# Patient Record
Sex: MALE | Race: WHITE | Employment: STUDENT | URBAN - METROPOLITAN AREA
[De-identification: names, ages, dates, MRNs, and addresses within clinical notes are randomized per-mention and may not be internally consistent; named-entity substitution may affect disease eponyms.]

---
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
## 2024-10-21 ENCOUNTER — HOSPITAL ENCOUNTER (EMERGENCY)
Age: 19
Discharge: HOME | End: 2024-10-21
Attending: EMERGENCY MEDICINE

## 2024-10-21 VITALS
SYSTOLIC BLOOD PRESSURE: 101 MMHG | HEART RATE: 80 BPM | TEMPERATURE: 98.3 F | RESPIRATION RATE: 17 BRPM | DIASTOLIC BLOOD PRESSURE: 64 MMHG | OXYGEN SATURATION: 98 % | HEIGHT: 67 IN | BODY MASS INDEX: 18.43 KG/M2 | WEIGHT: 117.4 LBS

## 2024-10-21 DIAGNOSIS — H10.211 CHEMICAL CONJUNCTIVITIS OF RIGHT EYE: Primary | ICD-10-CM

## 2024-10-21 PROCEDURE — 99283 EMERGENCY DEPT VISIT LOW MDM: CPT

## 2024-10-21 RX ORDER — TETRACAINE HYDROCHLORIDE 5 MG/ML
1 SOLUTION OPHTHALMIC ONCE
Status: DISCONTINUED | OUTPATIENT
Start: 2024-10-21 | End: 2024-10-21 | Stop reason: HOSPADM

## 2024-10-21 RX ORDER — NEOMYCIN SULFATE, POLYMYXIN B SULFATE AND DEXAMETHASONE 3.5; 10000; 1 MG/ML; [USP'U]/ML; MG/ML
1 SUSPENSION/ DROPS OPHTHALMIC 2 TIMES DAILY
Qty: 1 EACH | Refills: 0 | Status: SHIPPED | OUTPATIENT
Start: 2024-10-21 | End: 2024-10-31

## 2024-10-21 ASSESSMENT — PAIN DESCRIPTION - DESCRIPTORS: DESCRIPTORS: ACHING

## 2024-10-21 ASSESSMENT — LIFESTYLE VARIABLES
HOW OFTEN DO YOU HAVE A DRINK CONTAINING ALCOHOL: NEVER
HOW MANY STANDARD DRINKS CONTAINING ALCOHOL DO YOU HAVE ON A TYPICAL DAY: PATIENT DOES NOT DRINK

## 2024-10-21 ASSESSMENT — PAIN DESCRIPTION - ORIENTATION: ORIENTATION: RIGHT

## 2024-10-21 ASSESSMENT — PAIN SCALES - GENERAL: PAINLEVEL_OUTOF10: 2

## 2024-10-21 ASSESSMENT — PAIN DESCRIPTION - LOCATION: LOCATION: EYE

## 2024-10-21 ASSESSMENT — PAIN - FUNCTIONAL ASSESSMENT: PAIN_FUNCTIONAL_ASSESSMENT: 0-10

## 2024-10-22 NOTE — ED PROVIDER NOTES
EMERGENCY DEPARTMENT ENCOUNTER     [facility]  ED     Pt Name: Ted Villanueva   MRN: [id number]   Birthdate 2005   Date of evaluation: 10/21/2024   Provider: Gene Garces MD   PCP: ., *[facility] (Inactive)   Note Started: 5:25 AM EDT 10/22/24     CHIEF COMPLAINT     Chief Complaint   Patient presents with    Eye Injury     Large drop of bleach in right eye this morning at 1030. States he used some eye drops and flushed the eye this morning, but still having pain. States he has a stye on that eye too.         HISTORY OF PRESENT ILLNESS:  History from : Patient   Limitations to history : None     Ted Villanueva is a 19 y.o. male who presents for eye pain.  Patient reports that about 10 hours prior to arrival he got bleach in the right eye and flushed out with a garden hose.  He had increasing irritation throughout the day prompting presentation.  He denies any trouble with his vision.    Nursing Notes were all reviewed and agreed with or any disagreements were addressed in the HPI.     ROS: Positives and Pertinent negatives as per HPI.    PAST MEDICAL HISTORY     Past medical history:  has no past medical history on file.    Past surgical history:  has no past surgical history on file.    Med list:   No current facility-administered medications on file prior to encounter.     No current outpatient medications on file prior to encounter.       PHYSICAL EXAM:  ED Triage Vitals [10/21/24 1953]   BP Systolic BP Percentile Diastolic BP Percentile Temp Temp Source Pulse Respirations SpO2   122/78 -- -- 98.3 °F (36.8 °C) Oral 80 15 100 %      Height Weight - Scale         1.702 m (5' 7\") 53.3 kg (117 lb 6.4 oz)              Physical Exam   pH in the right eye is 7.  Fluorescein exam reveals no uptake.  Corneal injection noted.        EMERGENCY DEPARTMENT COURSE and DIFFERENTIAL DIAGNOSIS/MDM:          Vitals:    Vitals:    10/21/24 1953 10/21/24 2102   BP: 122/78 101/64   Pulse:

## 2025-05-31 ENCOUNTER — APPOINTMENT (OUTPATIENT)
Dept: GENERAL RADIOLOGY | Age: 20
End: 2025-05-31
Payer: COMMERCIAL

## 2025-05-31 ENCOUNTER — APPOINTMENT (OUTPATIENT)
Dept: CT IMAGING | Age: 20
End: 2025-05-31
Payer: COMMERCIAL

## 2025-05-31 ENCOUNTER — HOSPITAL ENCOUNTER (EMERGENCY)
Age: 20
Discharge: HOME | End: 2025-05-31
Payer: COMMERCIAL

## 2025-05-31 VITALS
SYSTOLIC BLOOD PRESSURE: 109 MMHG | TEMPERATURE: 98.9 F | HEART RATE: 93 BPM | OXYGEN SATURATION: 100 % | DIASTOLIC BLOOD PRESSURE: 63 MMHG | RESPIRATION RATE: 18 BRPM

## 2025-05-31 DIAGNOSIS — R55 SYNCOPE AND COLLAPSE: Primary | ICD-10-CM

## 2025-05-31 DIAGNOSIS — J18.9 PNEUMONIA DUE TO INFECTIOUS ORGANISM, UNSPECIFIED LATERALITY, UNSPECIFIED PART OF LUNG: ICD-10-CM

## 2025-05-31 LAB
ALBUMIN SERPL-MCNC: 5.3 G/DL (ref 3.4–5)
ALBUMIN/GLOB SERPL: 2 {RATIO} (ref 1.1–2.2)
ALP SERPL-CCNC: 72 U/L (ref 40–129)
ALT SERPL-CCNC: 28 U/L (ref 10–40)
ANION GAP SERPL CALCULATED.3IONS-SCNC: 14 MMOL/L (ref 3–16)
AST SERPL-CCNC: 33 U/L (ref 15–37)
BASOPHILS # BLD: 0.1 K/UL (ref 0–0.2)
BASOPHILS NFR BLD: 1.3 %
BILIRUB SERPL-MCNC: 0.4 MG/DL (ref 0–1)
BUN SERPL-MCNC: 20 MG/DL (ref 7–20)
CALCIUM SERPL-MCNC: 9.9 MG/DL (ref 8.3–10.6)
CHLORIDE SERPL-SCNC: 104 MMOL/L (ref 99–110)
CO2 SERPL-SCNC: 20 MMOL/L (ref 21–32)
CREAT SERPL-MCNC: 1 MG/DL (ref 0.9–1.3)
D-DIMER QUANTITATIVE: 2.6 UG/ML FEU (ref 0–0.6)
DEPRECATED RDW RBC AUTO: 13.3 % (ref 12.4–15.4)
EOSINOPHIL # BLD: 0.3 K/UL (ref 0–0.6)
EOSINOPHIL NFR BLD: 4 %
GFR SERPLBLD CREATININE-BSD FMLA CKD-EPI: >90 ML/MIN/{1.73_M2}
GLUCOSE SERPL-MCNC: 80 MG/DL (ref 70–99)
HCT VFR BLD AUTO: 41.7 % (ref 40.5–52.5)
HGB BLD-MCNC: 14.2 G/DL (ref 13.5–17.5)
LYMPHOCYTES # BLD: 3.1 K/UL (ref 1–5.1)
LYMPHOCYTES NFR BLD: 43.3 %
MCH RBC QN AUTO: 30.1 PG (ref 26–34)
MCHC RBC AUTO-ENTMCNC: 34.1 G/DL (ref 31–36)
MCV RBC AUTO: 88.3 FL (ref 80–100)
MONOCYTES # BLD: 0.4 K/UL (ref 0–1.3)
MONOCYTES NFR BLD: 5.7 %
NEUTROPHILS # BLD: 3.2 K/UL (ref 1.7–7.7)
NEUTROPHILS NFR BLD: 45.7 %
PLATELET # BLD AUTO: 206 K/UL (ref 135–450)
PMV BLD AUTO: 8.7 FL (ref 5–10.5)
POTASSIUM SERPL-SCNC: 4.2 MMOL/L (ref 3.5–5.1)
PROT SERPL-MCNC: 8 G/DL (ref 6.4–8.2)
RBC # BLD AUTO: 4.72 M/UL (ref 4.2–5.9)
SODIUM SERPL-SCNC: 138 MMOL/L (ref 136–145)
TROPONIN, HIGH SENSITIVITY: <6 NG/L (ref 0–22)
TROPONIN, HIGH SENSITIVITY: <6 NG/L (ref 0–22)
WBC # BLD AUTO: 7.1 K/UL (ref 4–11)

## 2025-05-31 PROCEDURE — 84484 ASSAY OF TROPONIN QUANT: CPT

## 2025-05-31 PROCEDURE — 99285 EMERGENCY DEPT VISIT HI MDM: CPT

## 2025-05-31 PROCEDURE — 36415 COLL VENOUS BLD VENIPUNCTURE: CPT

## 2025-05-31 PROCEDURE — 85379 FIBRIN DEGRADATION QUANT: CPT

## 2025-05-31 PROCEDURE — 6360000004 HC RX CONTRAST MEDICATION: Performed by: PHYSICIAN ASSISTANT

## 2025-05-31 PROCEDURE — 71046 X-RAY EXAM CHEST 2 VIEWS: CPT

## 2025-05-31 PROCEDURE — 71260 CT THORAX DX C+: CPT

## 2025-05-31 PROCEDURE — 96360 HYDRATION IV INFUSION INIT: CPT

## 2025-05-31 PROCEDURE — 96361 HYDRATE IV INFUSION ADD-ON: CPT

## 2025-05-31 PROCEDURE — 80053 COMPREHEN METABOLIC PANEL: CPT

## 2025-05-31 PROCEDURE — 93005 ELECTROCARDIOGRAM TRACING: CPT | Performed by: STUDENT IN AN ORGANIZED HEALTH CARE EDUCATION/TRAINING PROGRAM

## 2025-05-31 PROCEDURE — 70450 CT HEAD/BRAIN W/O DYE: CPT

## 2025-05-31 PROCEDURE — 2580000003 HC RX 258: Performed by: PHYSICIAN ASSISTANT

## 2025-05-31 PROCEDURE — 6370000000 HC RX 637 (ALT 250 FOR IP): Performed by: PHYSICIAN ASSISTANT

## 2025-05-31 PROCEDURE — 85025 COMPLETE CBC W/AUTO DIFF WBC: CPT

## 2025-05-31 RX ORDER — 0.9 % SODIUM CHLORIDE 0.9 %
1000 INTRAVENOUS SOLUTION INTRAVENOUS ONCE
Status: COMPLETED | OUTPATIENT
Start: 2025-05-31 | End: 2025-05-31

## 2025-05-31 RX ORDER — IOPAMIDOL 755 MG/ML
75 INJECTION, SOLUTION INTRAVASCULAR
Status: COMPLETED | OUTPATIENT
Start: 2025-05-31 | End: 2025-05-31

## 2025-05-31 RX ORDER — AZITHROMYCIN 250 MG/1
500 TABLET, FILM COATED ORAL ONCE
Status: COMPLETED | OUTPATIENT
Start: 2025-05-31 | End: 2025-05-31

## 2025-05-31 RX ORDER — ONDANSETRON 4 MG/1
4 TABLET, FILM COATED ORAL 3 TIMES DAILY PRN
Qty: 15 TABLET | Refills: 0 | Status: SHIPPED | OUTPATIENT
Start: 2025-05-31

## 2025-05-31 RX ORDER — AZITHROMYCIN 250 MG/1
TABLET, FILM COATED ORAL
Qty: 6 TABLET | Refills: 0 | Status: SHIPPED | OUTPATIENT
Start: 2025-05-31 | End: 2025-06-10

## 2025-05-31 RX ADMIN — IOPAMIDOL 75 ML: 755 INJECTION, SOLUTION INTRAVENOUS at 22:21

## 2025-05-31 RX ADMIN — AZITHROMYCIN 500 MG: 250 TABLET, FILM COATED ORAL at 23:48

## 2025-05-31 RX ADMIN — SODIUM CHLORIDE 1000 ML: 9 INJECTION, SOLUTION INTRAVENOUS at 20:18

## 2025-05-31 RX ADMIN — AMOXICILLIN AND CLAVULANATE POTASSIUM 1 TABLET: 875; 125 TABLET, FILM COATED ORAL at 23:48

## 2025-06-01 LAB
EKG ATRIAL RATE: 54 BPM
EKG DIAGNOSIS: NORMAL
EKG P AXIS: -5 DEGREES
EKG P-R INTERVAL: 126 MS
EKG Q-T INTERVAL: 402 MS
EKG QRS DURATION: 100 MS
EKG QTC CALCULATION (BAZETT): 381 MS
EKG R AXIS: 90 DEGREES
EKG T AXIS: 69 DEGREES
EKG VENTRICULAR RATE: 54 BPM

## 2025-06-01 PROCEDURE — 93010 ELECTROCARDIOGRAM REPORT: CPT | Performed by: INTERNAL MEDICINE

## 2025-06-01 NOTE — DISCHARGE INSTRUCTIONS
You can use OTC Tylenol every 4-6 hours as needed for headache.  Take medication as prescribed.  Follow-up with your primary care provider.

## 2025-06-02 NOTE — ED PROVIDER NOTES
[facility]    CHIEF COMPLAINT  Loss of Consciousness ([place] via [place] with c/o syncope. Pt felt lightheaded prior to syncopal episode, fell back and hit the back of his head on concrete.A/Ox4 )      SHARED SERVICE VISIT  Evaluated by LOAN.  My supervising physician was available for consultation.    HISTORY OF PRESENT ILLNESS  Ted Villanueva is a 20 y.o. male who presents to the ED complaining of syncopal episode.  He was at a festival earlier today when he began experiencing some nausea and lightheadedness.  He said he went to go sit down and next thing he knows he woke up staring at the anna.  Witnesses stated that he passed out and struck his head on the concrete.  After waking up he denies having any chest pain.  He is still feeling weak and nauseous and denies any previous episodes like this in the past. Denies any headache, body ache, fevers or chills.  Denies any coughing or sneezing.  Denies any sore throat or congestion.  Denies any vision changes or dizziness.  Denies any chest pain, shortness of breath, or dyspnea on exertion.  Denies any  abdominal pain.  Denies any urinary symptoms.  Denies any diarrhea or bloody stools.  Denies any new onset back pain.  Denies any recent travel or sick contacts.    No other complaints, modifying factors or associated symptoms.     Nursing notes reviewed.   No past medical history on file.  No past surgical history on file.  No family history on file.  Social History     Socioeconomic History    Marital status: Single     Spouse name: Not on file    Number of children: Not on file    Years of education: Not on file    Highest education level: Not on file   Occupational History    Not on file   Tobacco Use    Smoking status: Never    Smokeless tobacco: Never   Vaping Use    Vaping status: Never Used   Substance and Sexual Activity    Alcohol use: No    Drug use: No    Sexual activity: Not on

## 2025-06-10 ENCOUNTER — TELEPHONE (OUTPATIENT)
Dept: CASE MANAGEMENT | Age: 20
End: 2025-06-10

## 2025-06-10 NOTE — TELEPHONE ENCOUNTER
Imaging report CT Chest 5/31/25 with f/u imaging recommendations sent to Deya LERNER(R)  Patient Navigator  Incidentals/Lung Navigation  [email or web address]